# Patient Record
Sex: FEMALE | Race: WHITE | URBAN - METROPOLITAN AREA
[De-identification: names, ages, dates, MRNs, and addresses within clinical notes are randomized per-mention and may not be internally consistent; named-entity substitution may affect disease eponyms.]

---

## 2018-08-03 ENCOUNTER — HOSPITAL ENCOUNTER (EMERGENCY)
Facility: CLINIC | Age: 83
Discharge: HOME | End: 2018-08-03
Attending: EMERGENCY MEDICINE | Admitting: EMERGENCY MEDICINE
Payer: COMMERCIAL

## 2018-08-03 VITALS
OXYGEN SATURATION: 92 % | BODY MASS INDEX: 33.48 KG/M2 | RESPIRATION RATE: 24 BRPM | WEIGHT: 189 LBS | HEART RATE: 67 BPM | TEMPERATURE: 97.7 F | DIASTOLIC BLOOD PRESSURE: 91 MMHG | SYSTOLIC BLOOD PRESSURE: 132 MMHG

## 2018-08-03 DIAGNOSIS — S81.811A NONINFECTED SKIN TEAR OF RIGHT LOWER EXTREMITY, INITIAL ENCOUNTER: ICD-10-CM

## 2018-08-03 PROCEDURE — 99282 EMERGENCY DEPT VISIT SF MDM: CPT

## 2018-08-03 ASSESSMENT — ENCOUNTER SYMPTOMS
CONSTITUTIONAL NEGATIVE: 1
NEUROLOGICAL NEGATIVE: 1

## 2018-08-03 NOTE — ED AVS SNAPSHOT
Emergency Department    [address] [place] [address]    Phone:  [phone]    Fax:  [phone]                                       Regina Bell   MRN: [id number]    Department:   Emergency Department   Date of Visit:  8/3/2018           After Visit Summary Signature Page     I have received my discharge instructions, and my questions have been answered. I have discussed any challenges I see with this plan with the nurse or doctor.    ..........................................................................................................................................  Patient/Patient Representative Signature      ..........................................................................................................................................  Patient Representative Print Name and Relationship to Patient    ..................................................               ................................................  Date                                            Time    ..........................................................................................................................................  Reviewed by Signature/Title    ...................................................              ..............................................  Date                                                            Time

## 2018-08-03 NOTE — ED AVS SNAPSHOT
Emergency Department    [address] [facility] [address]    Phone:  [phone]    Fax:  [phone]                                       Regina Bell   MRN: [id number]    Department:   Emergency Department   Date of Visit:  8/3/2018           Patient Information     Date Of Birth          5/28/1934        Your diagnoses for this visit were:     Noninfected skin tear of right lower extremity, initial encounter        You were seen by Jose A Knight MD.      Follow-up Information     Call Destinee Zhao MD.    Specialty:  Internal Medicine    Why:  As needed    Contact information:    [facility]  [address] [facility] [address]  [facility] [address] [phone]          Follow up with  Emergency Department.    Specialty:  EMERGENCY MEDICINE    Why:  As needed, If symptoms worsen    Contact information:    [address] [facility] [address] [phone]      Discharge References/Attachments     SKIN AVULSION (ENGLISH)      24 Hour Appointment Hotline       To make an appointment at any [facility], call [phone] ([phone]). If you don't have a family doctor or clinic, we will help you find one. [facility] are conveniently located to serve the needs of you and your family.             Review of your medicines      Our records show that you are taking the medicines listed below. If these are incorrect, please call your family doctor or clinic.        Dose / Directions Last dose taken    acetaminophen 325 MG tablet   Commonly known as:  TYLENOL   Dose:  1-2 tablet   Quantity:  250 tablet        Take 1-2 tablets by mouth every 4 hours as needed.   Refills:  0        aspirin 81 MG EC tablet   Dose:  81 mg   Quantity:  90 tablet        Take 1 tablet by mouth daily.   Refills:  3        ATENOLOL PO   Dose:  50 mg        Take 50 mg by mouth 2 times daily.   Refills:  0        buPROPion 100 MG 12 hr tablet   Commonly known as:  WELLBUTRIN SR    Dose:  100 mg   Quantity:  60 tablet        Take 1 tablet by mouth daily.   Refills:  0        calcium + D 600-200 MG-UNIT Tabs   Dose:  1 tablet   Generic drug:  calcium carbonate-vitamin D        Take 1 tablet by mouth 2 times daily.   Refills:  0        fluticasone-salmeterol 500-50 MCG/DOSE diskus inhaler   Commonly known as:  ADVAIR   Dose:  1 puff        Inhale 1 puff into the lungs every 12 hours.   Refills:  0        * gabapentin 100 MG tablet   Commonly known as:  NEURONTIN   Dose:  100 mg        Take 100 mg by mouth.   Refills:  0        * GABAPENTIN PO   Dose:  300 mg        Take 300 mg by mouth At Bedtime.   Refills:  0        HYDROcodone-acetaminophen 5-500 MG per tablet   Commonly known as:  VICODIN   Dose:  1 tablet   Quantity:  30 tablet        Take 1 tablet by mouth every 6 hours as needed for pain.   Refills:  0        isosorbide mononitrate 30 MG 24 hr tablet   Commonly known as:  IMDUR   Dose:  30 mg   Quantity:  90 tablet        Take 1 tablet by mouth daily.   Refills:  1        levothyroxine 175 MCG tablet   Commonly known as:  SYNTHROID/LEVOTHROID   Dose:  175 mcg        Take 175 mcg by mouth daily.   Refills:  0        mirtazapine 15 MG tablet   Commonly known as:  REMERON   Dose:  7.5 mg   Quantity:  30 tablet        Take 0.5 tablets by mouth At Bedtime.   Refills:  3        PAROXETINE HCL PO   Dose:  40 mg        Take 40 mg by mouth At Bedtime.   Refills:  0        phenol-menthol 14.5 MG lozenge   Dose:  1 lozenge   Quantity:  100 tablet        Take 1 lozenge by mouth every hour as needed.   Refills:  0        POTASSIUM CHLORIDE CR PO   Dose:  40 mEq        Take 40 mEq by mouth 2 times daily.   Refills:  0        PREDNISONE PO   Dose:  10 mg        Take 10 mg by mouth daily.   Refills:  0        PRILOSEC PO        20 mg qam   Refills:  0        PRIMIDONE PO   Dose:  150 mg        Take 150 mg by mouth At Bedtime.   Refills:  0        simethicone 125 MG Chew chewable tablet   Commonly known  as:  MYLANTA GAS   Dose:  125 mg        Take 1 tablet by mouth 4 times daily as needed.   Refills:  0        VITAMIN B-12 2000 MCG Tbcr        Take  by mouth daily.   Refills:  0        VITAMIN D-3 PO        Take  by mouth. 2000 IU daily   Refills:  0        VYTORIN 10-40 MG per tablet   Generic drug:  ezetimibe-simvastatin        Take  by mouth At Bedtime.   Refills:  0        * Notice:  This list has 2 medication(s) that are the same as other medications prescribed for you. Read the directions carefully, and ask your doctor or other care provider to review them with you.            Orders Needing Specimen Collection     None      Pending Results     No orders found from 8/1/2018 to 8/4/2018.            Pending Culture Results     No orders found from 8/1/2018 to 8/4/2018.            Pending Results Instructions     If you had any lab results that were not finalized at the time of your Discharge, you can call the ED Lab Result RN at [phone]. You will be contacted by this team for any positive Lab results or changes in treatment. The nurses are available 7 days a week from 10A to 6:30P.  You can leave a message 24 hours per day and they will return your call.        Test Results From Your Hospital Stay               Clinical Quality Measure: Blood Pressure Screening     Your blood pressure was checked while you were in the emergency department today. The last reading we obtained was  BP: (!) 132/91 . Please read the guidelines below about what these numbers mean and what you should do about them.  If your systolic blood pressure (the top number) is less than 120 and your diastolic blood pressure (the bottom number) is less than 80, then your blood pressure is normal. There is nothing more that you need to do about it.  If your systolic blood pressure (the top number) is 120-139 or your diastolic blood pressure (the bottom number) is 80-89, your blood pressure may be higher than it should be. You should have  "your blood pressure rechecked within a year by a primary care provider.  If your systolic blood pressure (the top number) is 140 or greater or your diastolic blood pressure (the bottom number) is 90 or greater, you may have high blood pressure. High blood pressure is treatable, but if left untreated over time it can put you at risk for heart attack, stroke, or kidney failure. You should have your blood pressure rechecked by a primary care provider within the next 4 weeks.  If your provider in the emergency department today gave you specific instructions to follow-up with your doctor or provider even sooner than that, you should follow that instruction and not wait for up to 4 weeks for your follow-up visit.        Thank you for choosing [facility]       Thank you for choosing [facility] for your care. Our goal is always to provide you with excellent care. Hearing back from our patients is one way we can continue to improve our services. Please take a few minutes to complete the written survey that you may receive in the mail after you visit with us. Thank you!        [facility] Information     [facility] lets you send messages to your doctor, view your test results, renew your prescriptions, schedule appointments and more. To sign up, go to [email or web address] . Click on \"Log in\" on the left side of the screen, which will take you to the Welcome page. Then click on \"Sign up Now\" on the right side of the page.     You will be asked to enter the access code listed below, as well as some personal information. Please follow the directions to create your username and password.     Your access code is: [email or web address]  Expires: 2018 10:06 PM     Your access code will  in 90 days. If you need help or a new code, please call your [facility] clinic or [phone].        Care EveryWhere ID     This is your Care EveryWhere ID. This could be used by other organizations to access your [facility] medical records  [id number]   "      Equal Access to Services     TRINA BECERRA : Lukas Sifuentes, paulino merrill, ashok alonso. So [facility] [phone].    ATENCIÓN: Si habla español, tiene a flores disposición servicios gratuitos de asistencia lingüística. Llame al [phone].    We comply with applicable federal civil rights laws and [place] laws. We do not discriminate on the basis of race, color, national origin, age, disability, sex, sexual orientation, or gender identity.            After Visit Summary       This is your record. Keep this with you and show to your community pharmacist(s) and doctor(s) at your next visit.

## 2018-08-04 NOTE — ED NOTES
Bed: ED30  Expected date:   Expected time:   Means of arrival:   Comments:  535  84 F skin tear R LE/long term care pt  0827     Carmen Dunaway RN  08/03/18 2034

## 2019-01-01 ENCOUNTER — DOCUMENTATION ONLY (OUTPATIENT)
Dept: OTHER | Facility: CLINIC | Age: 84
End: 2019-01-01